# Patient Record
Sex: FEMALE | Race: WHITE | NOT HISPANIC OR LATINO | URBAN - METROPOLITAN AREA
[De-identification: names, ages, dates, MRNs, and addresses within clinical notes are randomized per-mention and may not be internally consistent; named-entity substitution may affect disease eponyms.]

---
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
## 2018-01-14 VITALS
HEART RATE: 62 BPM | DIASTOLIC BLOOD PRESSURE: 68 MMHG | SYSTOLIC BLOOD PRESSURE: 107 MMHG | HEIGHT: 63 IN | BODY MASS INDEX: 21.97 KG/M2 | WEIGHT: 124 LBS

## 2018-01-16 NOTE — CONSULTS
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  She is no alarm symptoms  She occasional has bloating dysphagia and heartburn which is improving  1 I told her to take align probiotic for a month  2I told her to take Pepcid twice a day for 2 weeks  3 if she calls back and she is not well she will get an endoscopy  History of Present Illness  HPI: She is a 49-year-old female who had some sort of viral syndrome and then had epigastric abdominal pain for a day she went to [facility] emergency room and was had normal blood work and urine testing  She then had pain afterwards with some occasional heartburn and dysphagia and bloating she's mild constipation no family history of Crohn's disease ulcerative colitis or celiac disease  Review of Systems  [facility]:   Constitutional: No complaints of fever or chills, feels well, no tiredness, no recent weight gain or loss  Eyes: No complaints of eye pain, no discharge, no eyesight problems, eyes do not itch, no red or dry eyes  ENT: no complaints of nasal discharge, no hoarseness, no earache, no nosebleeds, no loss of hearing, no sore throat  Cardiovascular: No complaints of chest pain, no palpitations, normal heart rate, no lower extremity edema  Respiratory: No complaints of cough, no shortness of breath, no wheezing, no leg claudication  Gastrointestinal: No complaints of abdominal pain, no nausea or vomiting, no constipation, no diarrhea or bloody stools  Genitourinary: No complaints of incontinence, no pelvic pain, no dysuria or dysmenorrhea, no abnormal vaginal bleeding or vaginal discharge     Musculoskeletal: No complaints of limb swelling or limb pain, no myalgias, no joint swelling or joint stiffness  Integumentary: No complaints of skin rash, no skin lesions or wounds, no itching, no breast pain, no breast lump  Neurological: No complaints of headache, no numbness or tingling, no confusion, no dizziness, no limb weakness, no convulsions or fainting, no difficulty walking  Psychiatric: No complaints of feeling depressed, no suicidal thoughts, no emotional problems, no anxiety, no sleep disturbances, no change in personality  Endocrine: No complaints of feeling weak, no muscle weakness, no deepening of voice, no hot flashes or proptosis  Hematologic/Lymphatic: No complaints of swollen glands, no neck swollen glands, does not bleed or bruise easily  ROS reported by the patient  ROS Reviewed:   ROS reviewed  Past Medical History  Active Problems And Past Medical History Reviewed: The active problems and past medical history were reviewed and updated today  Surgical History  Surgical History Reviewed: The surgical history was reviewed and updated today  Family History  Family History Reviewed: The family history was reviewed and updated today  Social History    · Never a smoker  Social History Reviewed: The social history was reviewed and updated today  The social history was reviewed and is unchanged  Current Meds   1  Loestrin 1/20 (21) 1-20 MG-MCG Oral Tablet; Take 1 tablet daily Recorded  Medication List Reviewed: The medication list was reviewed and updated today  Allergies    1  No Known Drug Allergies    Vitals  Vital Signs    Recorded: [id number] 03:34PM   Heart Rate 72   Systolic 871   Diastolic 80   Height 5 ft 2 in   Weight 123 lb 6 08 oz   BMI Calculated 22 57   BSA Calculated 1 56     Physical Exam    Constitutional - General appearance: No acute distress, well appearing and well nourished  Eyes - Conjunctiva and lids: No injection, edema or discharge  Pupils and irises: Equal, round, reactive to light bilaterally     Ears, Nose, Mouth, and Throat - External inspection of ears and nose: Normal without deformities or discharge  Otoscopic examination: Tympanic membranes gray, translucent with good bony landmarks and light reflex  Canals patent without erythema  Nasal mucosa, septum, and turbinates: Normal, no edema or discharge  Oropharynx: Moist mucosa, normal tongue and tonsils without lesions  Neck - Neck: Supple, symmetric, no masses  Pulmonary - Respiratory effort: Normal respiratory rate and rhythm, no increased work of breathing  Auscultation of lungs: Clear bilaterally  Cardiovascular - Auscultation of heart: Regular rate and rhythm, normal S1 and S2, no murmur  Pedal pulses: Normal, 2+ bilaterally  Examination of extremities for edema and/or varicosities: Normal    Abdomen - Abdomen: Normal bowel sounds, soft, non-tender, no masses  Liver and spleen: No hepatomegaly or splenomegaly  Lymphatic - Palpation of lymph nodes in neck: No anterior or posterior cervical lymphadenopathy  Musculoskeletal - Gait and station: Normal gait  Digits and nails: Normal without clubbing or cyanosis   Inspection/palpation of joints, bones, and muscles: Normal    Skin - Skin and subcutaneous tissue: Normal       Future Appointments    Date/Time Provider Specialty Site   03/31/2017 01:00 PM rCistina Terrazas MD Neurology [facility] [address]   Electronically signed by : Pati Velasquez MD; Mar  9 2017  4:02PM EST                       (Author)

## 2018-01-22 VITALS
BODY MASS INDEX: 22.7 KG/M2 | HEART RATE: 72 BPM | DIASTOLIC BLOOD PRESSURE: 80 MMHG | HEIGHT: 62 IN | SYSTOLIC BLOOD PRESSURE: 100 MMHG | WEIGHT: 123.38 LBS

## 2019-09-04 LAB
EXTERNAL HIV SCREEN: NORMAL
HCV AB SER-ACNC: NEGATIVE

## 2021-01-26 ENCOUNTER — APPOINTMENT (OUTPATIENT)
Dept: LAB | Facility: MEDICAL CENTER | Age: 21
End: 2021-01-26
Payer: COMMERCIAL

## 2021-01-26 ENCOUNTER — OFFICE VISIT (OUTPATIENT)
Dept: FAMILY MEDICINE CLINIC | Facility: CLINIC | Age: 21
End: 2021-01-26
Payer: COMMERCIAL

## 2021-01-26 VITALS
DIASTOLIC BLOOD PRESSURE: 68 MMHG | WEIGHT: 111 LBS | HEART RATE: 63 BPM | BODY MASS INDEX: 19.67 KG/M2 | SYSTOLIC BLOOD PRESSURE: 106 MMHG | TEMPERATURE: 98.5 F | RESPIRATION RATE: 16 BRPM | HEIGHT: 63 IN | OXYGEN SATURATION: 98 %

## 2021-01-26 DIAGNOSIS — R59.0 POSTERIOR CERVICAL ADENOPATHY: ICD-10-CM

## 2021-01-26 DIAGNOSIS — T78.40XA ALLERGY, INITIAL ENCOUNTER: ICD-10-CM

## 2021-01-26 DIAGNOSIS — L21.9 SEBORRHEA: Primary | ICD-10-CM

## 2021-01-26 DIAGNOSIS — R53.83 FATIGUE, UNSPECIFIED TYPE: ICD-10-CM

## 2021-01-26 DIAGNOSIS — E78.00 HYPERCHOLESTEROLEMIA: ICD-10-CM

## 2021-01-26 LAB
ALBUMIN SERPL BCP-MCNC: 4.5 G/DL (ref 3.5–5)
ALP SERPL-CCNC: 89 U/L (ref 46–116)
ALT SERPL W P-5'-P-CCNC: 29 U/L (ref 12–78)
ANION GAP SERPL CALCULATED.3IONS-SCNC: 2 MMOL/L (ref 4–13)
AST SERPL W P-5'-P-CCNC: 20 U/L (ref 5–45)
BILIRUB SERPL-MCNC: 0.84 MG/DL (ref 0.2–1)
BUN SERPL-MCNC: 9 MG/DL (ref 5–25)
CALCIUM SERPL-MCNC: 9.7 MG/DL (ref 8.3–10.1)
CHLORIDE SERPL-SCNC: 107 MMOL/L (ref 100–108)
CHOLEST SERPL-MCNC: 180 MG/DL (ref 50–200)
CO2 SERPL-SCNC: 30 MMOL/L (ref 21–32)
CREAT SERPL-MCNC: 0.84 MG/DL (ref 0.6–1.3)
ERYTHROCYTE [DISTWIDTH] IN BLOOD BY AUTOMATED COUNT: 12.4 % (ref 11.6–15.1)
GFR SERPL CREATININE-BSD FRML MDRD: 99 ML/MIN/1.73SQ M
GLUCOSE P FAST SERPL-MCNC: 82 MG/DL (ref 65–99)
HCT VFR BLD AUTO: 40.1 % (ref 34.8–46.1)
HDLC SERPL-MCNC: 49 MG/DL
HGB BLD-MCNC: 12.9 G/DL (ref 11.5–15.4)
LDLC SERPL CALC-MCNC: 117 MG/DL (ref 0–100)
MCH RBC QN AUTO: 29.1 PG (ref 26.8–34.3)
MCHC RBC AUTO-ENTMCNC: 32.2 G/DL (ref 31.4–37.4)
MCV RBC AUTO: 91 FL (ref 82–98)
NONHDLC SERPL-MCNC: 131 MG/DL
PLATELET # BLD AUTO: 308 THOUSANDS/UL (ref 149–390)
PMV BLD AUTO: 10.2 FL (ref 8.9–12.7)
POTASSIUM SERPL-SCNC: 4.1 MMOL/L (ref 3.5–5.3)
PROT SERPL-MCNC: 7.6 G/DL (ref 6.4–8.2)
RBC # BLD AUTO: 4.43 MILLION/UL (ref 3.81–5.12)
SODIUM SERPL-SCNC: 139 MMOL/L (ref 136–145)
TRIGL SERPL-MCNC: 68 MG/DL
TSH SERPL DL<=0.05 MIU/L-ACNC: 1.98 UIU/ML (ref 0.36–3.74)
WBC # BLD AUTO: 5.69 THOUSAND/UL (ref 4.31–10.16)

## 2021-01-26 PROCEDURE — 85027 COMPLETE CBC AUTOMATED: CPT

## 2021-01-26 PROCEDURE — 80061 LIPID PANEL: CPT

## 2021-01-26 PROCEDURE — 84443 ASSAY THYROID STIM HORMONE: CPT

## 2021-01-26 PROCEDURE — 99203 OFFICE O/P NEW LOW 30 MIN: CPT | Performed by: INTERNAL MEDICINE

## 2021-01-26 PROCEDURE — 36415 COLL VENOUS BLD VENIPUNCTURE: CPT

## 2021-01-26 PROCEDURE — 80053 COMPREHEN METABOLIC PANEL: CPT

## 2021-01-26 RX ORDER — KETOCONAZOLE 20 MG/ML
SHAMPOO TOPICAL
Qty: 120 ML | Refills: 1 | Status: SHIPPED | OUTPATIENT
Start: 2021-01-26

## 2021-01-26 RX ORDER — CEFPROZIL 500 MG/1
500 TABLET, FILM COATED ORAL 2 TIMES DAILY
Qty: 20 TABLET | Refills: 0 | Status: SHIPPED | OUTPATIENT
Start: 2021-01-26 | End: 2021-10-01

## 2021-01-26 RX ORDER — MOMETASONE FUROATE 1 MG/G
CREAM TOPICAL DAILY
COMMUNITY

## 2021-01-26 NOTE — PROGRESS NOTES
Assessment/Plan:         Diagnoses and all orders for this visit:    Seborrhea  Comments:  nizoral/? superinfect  Orders:  -     cefprosil (CEFZIL) 500 MG tablet; Take 1 tablet (500 mg total) by mouth 2 (two) times a day for 10 days  -     ketoconazole (NIZORAL) 2 % shampoo; Wash hair 2 x / weekly for a month allow it to set on scalp x 4 min  After 1 month use only weekly  -     Ambulatory referral to Allergy; Future    Posterior cervical adenopathy  Comments:  one node rt upper post cerv node  Orders:  -     CBC; Future  -     Ambulatory referral to Allergy; Future    Hypercholesterolemia  Comments:  due for lab  Orders:  -     Comprehensive metabolic panel; Future  -     Lipid panel; Future    Fatigue, unspecified type  Comments:  due for lab  Orders:  -     TSH, 3rd generation with Free T4 reflex; Future    Allergy, initial encounter  Comments:  request appt ? allergic to eye makeup    Other orders  -     Mirena, 52 MG, 20 MCG/24HR IUD  -     mometasone (ELOCON) 0 1 % cream; Apply topically daily          Subjective:      Patient ID: Soniya Shore is a 23 y o  female  Pt complains of ? pimpiles on scalp and had a line to ? Node on rt occipital lymph node  +tender to touch  The node is getting smaller  The following portions of the patient's history were reviewed and updated as appropriate: She  has a past medical history of Eczema and Migraines  She   Patient Active Problem List    Diagnosis Date Noted    Hypercholesterolemia 01/26/2021     She  has no past surgical history on file  Her family history includes No Known Problems in her father; Seizures in her mother  She  reports that she has never smoked  She has never used smokeless tobacco  She reports current alcohol use  She reports that she does not use drugs    Current Outpatient Medications   Medication Sig Dispense Refill    Mirena, 52 MG, 20 MCG/24HR IUD       mometasone (ELOCON) 0 1 % cream Apply topically daily      cefprosil (CEFZIL) 500 MG tablet Take 1 tablet (500 mg total) by mouth 2 (two) times a day for 10 days 20 tablet 0    ketoconazole (NIZORAL) 2 % shampoo Wash hair 2 x / weekly for a month allow it to set on scalp x 4 min  After 1 month use only weekly 120 mL 1     No current facility-administered medications for this visit  Current Outpatient Medications on File Prior to Visit   Medication Sig    Mirena, 52 MG, 20 MCG/24HR IUD     mometasone (ELOCON) 0 1 % cream Apply topically daily     No current facility-administered medications on file prior to visit  She has No Known Allergies       Review of Systems   Constitutional: Negative  HENT: Negative  Respiratory: Negative  Cardiovascular: Negative  Skin:        +acnea  Gets puffy at eyes with using mascara or eyeliner  If she uses vegan mascara it is better  Objective:      /68 (BP Location: Left arm, Patient Position: Sitting, Cuff Size: Standard)   Pulse 63   Temp 98 5 °F (36 9 °C) (Temporal)   Resp 16   Ht 5' 2 5" (1 588 m)   Wt 50 3 kg (111 lb)   SpO2 98%   BMI 19 98 kg/m²          Physical Exam  Constitutional:       Appearance: Normal appearance  HENT:      Head: Normocephalic and atraumatic  Right Ear: Tympanic membrane, ear canal and external ear normal       Left Ear: Tympanic membrane, ear canal and external ear normal       Nose: Nose normal       Mouth/Throat:      Pharynx: No posterior oropharyngeal erythema  Neck:      Musculoskeletal: Neck supple  Cardiovascular:      Rate and Rhythm: Normal rate and regular rhythm  Pulmonary:      Effort: Pulmonary effort is normal       Breath sounds: Normal breath sounds  Neurological:      Mental Status: She is alert

## 2021-01-27 ENCOUNTER — TELEPHONE (OUTPATIENT)
Dept: ADMINISTRATIVE | Facility: OTHER | Age: 21
End: 2021-01-27

## 2021-01-27 NOTE — TELEPHONE ENCOUNTER
Upon review of the In Basket request we were able to locate, review, and update the patient chart as requested for Hepatitis C , HIV and Pap Smear (HPV) aka Cervical Cancer Screening  Any additional questions or concerns should be emailed to the Practice Liaisons via [email or web address] email, please do not reply via In Basket      Thank you  Kiana Obregon

## 2021-01-27 NOTE — TELEPHONE ENCOUNTER
----- Message from Cornell Conde sent at 1/26/2021 12:59 PM EST -----  Regarding: sebastian khan  Contact: [phone]  01/26/21 12:59 PM    Hello, our patient Filomena Haq has had Pap Smear (HPV) aka Cervical Cancer Screening completed/performed  Please assist in updating the patient chart by pulling the Care Everywhere (CE) document  The date of service is 10/15/2020       Thank you,  JAMILA Conde PG

## 2021-02-15 ENCOUNTER — OFFICE VISIT (OUTPATIENT)
Dept: FAMILY MEDICINE CLINIC | Facility: CLINIC | Age: 21
End: 2021-02-15
Payer: COMMERCIAL

## 2021-02-15 VITALS
DIASTOLIC BLOOD PRESSURE: 62 MMHG | RESPIRATION RATE: 16 BRPM | HEART RATE: 65 BPM | TEMPERATURE: 97.8 F | BODY MASS INDEX: 20.02 KG/M2 | SYSTOLIC BLOOD PRESSURE: 98 MMHG | HEIGHT: 63 IN | OXYGEN SATURATION: 95 % | WEIGHT: 113 LBS

## 2021-02-15 DIAGNOSIS — R29.898 WEAKNESS OF RIGHT LOWER EXTREMITY: Primary | ICD-10-CM

## 2021-02-15 DIAGNOSIS — L21.9 SEBORRHEA: ICD-10-CM

## 2021-02-15 PROCEDURE — 99213 OFFICE O/P EST LOW 20 MIN: CPT | Performed by: INTERNAL MEDICINE

## 2021-02-15 RX ORDER — MELOXICAM 15 MG/1
15 TABLET ORAL DAILY
Qty: 10 TABLET | Refills: 1 | Status: SHIPPED | OUTPATIENT
Start: 2021-02-15

## 2021-02-15 NOTE — PROGRESS NOTES
Assessment/Plan:         Diagnoses and all orders for this visit:    Weakness of right lower extremity  Comments:  will try nsaid and pt eval  Orders:  -     meloxicam (MOBIC) 15 mg tablet; Take 1 tablet (15 mg total) by mouth daily  -     Ambulatory referral to Physical Therapy; Future    Seborrhea          Subjective:      Patient ID: Sumit Alba is a 23 y o  female  Her post upper cerv lymph node practically resolved and less skin scalp lesions with nizoral   Her rt hip feels popping and she feels asymetric ? Weak in le  The following portions of the patient's history were reviewed and updated as appropriate: She  has a past medical history of Eczema and Migraines  She   Patient Active Problem List    Diagnosis Date Noted    Hypercholesterolemia 01/26/2021     She  has no past surgical history on file  Her family history includes No Known Problems in her father; Seizures in her mother  She  reports that she has never smoked  She has never used smokeless tobacco  She reports current alcohol use  She reports that she does not use drugs  Current Outpatient Medications   Medication Sig Dispense Refill    ketoconazole (NIZORAL) 2 % shampoo Wash hair 2 x / weekly for a month allow it to set on scalp x 4 min  After 1 month use only weekly 120 mL 1    Mirena, 52 MG, 20 MCG/24HR IUD       mometasone (ELOCON) 0 1 % cream Apply topically daily      meloxicam (MOBIC) 15 mg tablet Take 1 tablet (15 mg total) by mouth daily 10 tablet 1     No current facility-administered medications for this visit  Current Outpatient Medications on File Prior to Visit   Medication Sig    ketoconazole (NIZORAL) 2 % shampoo Wash hair 2 x / weekly for a month allow it to set on scalp x 4 min  After 1 month use only weekly    Mirena, 52 MG, 20 MCG/24HR IUD     mometasone (ELOCON) 0 1 % cream Apply topically daily     No current facility-administered medications on file prior to visit        She has No Known Allergies       Review of Systems   Constitutional: Negative  HENT: Negative  Respiratory: Negative  Cardiovascular: Negative  Objective:      BP 98/62 (BP Location: Left arm, Patient Position: Sitting, Cuff Size: Standard)   Pulse 65   Temp 97 8 °F (36 6 °C) (Temporal)   Resp 16   Ht 5' 2 5" (1 588 m)   Wt 51 3 kg (113 lb)   SpO2 95%   BMI 20 34 kg/m²          Physical Exam  Constitutional:       Appearance: Normal appearance  HENT:      Head: Normocephalic and atraumatic  Neck:      Musculoskeletal: Neck supple  No muscular tenderness  Cardiovascular:      Rate and Rhythm: Normal rate and regular rhythm  Pulmonary:      Effort: Pulmonary effort is normal       Breath sounds: Normal breath sounds  Lymphadenopathy:      Cervical: No cervical adenopathy  Neurological:      Mental Status: She is alert

## 2021-03-30 ENCOUNTER — EVALUATION (OUTPATIENT)
Dept: PHYSICAL THERAPY | Facility: CLINIC | Age: 21
End: 2021-03-30
Payer: COMMERCIAL

## 2021-03-30 DIAGNOSIS — R29.898 RIGHT LEG WEAKNESS: Primary | ICD-10-CM

## 2021-03-30 PROCEDURE — 97110 THERAPEUTIC EXERCISES: CPT | Performed by: PHYSICAL THERAPIST

## 2021-03-30 PROCEDURE — 97161 PT EVAL LOW COMPLEX 20 MIN: CPT | Performed by: PHYSICAL THERAPIST

## 2021-03-30 NOTE — PROGRESS NOTES
PT Evaluation     Today's date: 3/30/2021  Patient name: Vanessa Montejo  : 2000  MRN: [id number]  Referring provider: Anali Angelo DO  Dx:   Encounter Diagnosis     ICD-10-CM    1  Right leg weakness  R29 898        Start Time: 1545  Stop Time: 1630  Total time in clinic (min): 45 minutes    Assessment  Assessment details: Pt is a 24 y/o female presenting to physical therapy with chief complaint of RLE weakness, particularly in the R hip  In standing, she presents with B pes planus (R>L), higher iliac crest and PSIS on the L  In supine, the L ASIS and iliac crest are higher, but leg length is equally bilaterally  This was normalized following L hip LAD  Pt has weakness in the R hip abductor, which is likely contributing to her altered squatting mechanics  Her R pes planus is also likely playing a role in squat mechanics as well  Pt would benefit from physical therapy in order to improve hip abduction weakness and mechanics in order to improve symptoms  Impairments: abnormal or restricted ROM, activity intolerance, impaired physical strength, lacks appropriate home exercise program and pain with function  Functional limitations: squatting  Symptom irritability: lowUnderstanding of Dx/Px/POC: good   Prognosis: good    Goals  ST weeks  1  Pt will demonstrate independence with HEP  2  Pt will improve R hip abduction by at leas 1/2 grade  3  Pt will demonstrate equal WS during squatting    LT weeks  1  Pt will report a reduction in symptoms  2  Pt will improve R hip abduction strength to 4+/5  3   Pt will be able to exercise without symptoms    Plan  Plan details: Pt will call to schedule once she gets her work schedule  Patient would benefit from: skilled physical therapy  Planned modality interventions: cryotherapy and thermotherapy: hydrocollator packs  Planned therapy interventions: therapeutic exercise, therapeutic activities, stretching, strengthening, patient education, neuromuscular re-education, massage, manual therapy, balance, gait training and home exercise program  Frequency: 1x week  Duration in weeks: 4  Treatment plan discussed with: patient        Subjective Evaluation    History of Present Illness  Mechanism of injury: Pt reports for about 2 years now, one side of her body "feels completely different than the other "  She reports she notices her hip popping and this is the main area where she feels the imbalance  She reports differences in how she straightens her knee and points her toes  She reports she "can never sit and feel even " She reports minimal discomfort, but not pain  She reports it is difficulty for her to balance on the RLE             Recurrent probem    Quality of life: good    Pain  Current pain ratin  At best pain ratin  At worst pain ratin  Quality: discomfort  Aggravating factors: lifting  Progression: no change    Treatments  Previous treatment: chiropractic  Patient Goals  Patient goals for therapy: increased strength and return to sport/leisure activities          Objective     Postural Observations    Additional Postural Observation Details  Standing: L iliac crest higher, L PSIS higher  Supine: L iliac crest higher, L ASIS higher    Improved alignment following L hip LAD    Neurological Testing     Reflexes   Left   Patellar (L4): normal (2+)  Achilles (S1): normal (2+)    Right   Patellar (L4): normal (2+)  Achilles (S1): normal (2+)    Active Range of Motion     Lumbar   Flexion:  WFL  Extension:  WFL  Left lateral flexion:  WFL  Right lateral flexion:  WFL    Strength/Myotome Testing     Left Hip   Planes of Motion   Flexion: 4+  Abduction: 4+  External rotation: 4+  Internal rotation: 4+    Right Hip   Planes of Motion   Flexion: 4+  Abduction: 4-  External rotation: 4+  Internal rotation: 4+    Left Knee   Flexion: 4+  Extension: 4+    Right Knee   Flexion: 4+  Extension: 4+    Left Ankle/Foot   Dorsiflexion: 4+    Right Ankle/Foot Dorsiflexion: 4+    Tests     Lumbar   Negative SIJ compression and sacroiliac distraction  Right Hip   Negative ALISON and FADIR       General Comments:      Lumbar Comments  Squatting: WS noted to the R    Ankle/Foot Comments   B pes planus, R>L             Precautions: **1x/week, UPDATE HEP EACH VISIT**      Manuals 3/30                                                                Neuro Re-Ed             STS c TB HEP            Static stork             Arch raises                                                                 Ther Ex             Clamshells HEP            S/L hip abd HEP            SKFO HEP                                                                             Ther Activity                                       Gait Training                                       Modalities

## 2021-04-06 ENCOUNTER — OFFICE VISIT (OUTPATIENT)
Dept: PHYSICAL THERAPY | Facility: CLINIC | Age: 21
End: 2021-04-06
Payer: COMMERCIAL

## 2021-04-06 DIAGNOSIS — R29.898 RIGHT LEG WEAKNESS: Primary | ICD-10-CM

## 2021-04-06 PROCEDURE — 97112 NEUROMUSCULAR REEDUCATION: CPT

## 2021-04-06 PROCEDURE — 97110 THERAPEUTIC EXERCISES: CPT

## 2021-04-06 NOTE — PROGRESS NOTES
Daily Note     Today's date: 2021  Patient name: Toshia Stacy  : 2000  MRN: [id number]  Referring provider: Meir Garcia DO  Dx:   Encounter Diagnosis     ICD-10-CM    1  Right leg weakness  R29 898                   Subjective: Pt states she is feeling stronger and is feeling less R knee pain now  Objective: See treatment diary below      Assessment: Patient performing hip ER in order to compensate for arch raises  Able to maintain this following verbal cuing  Requires FTA at times during SLS to maintain balance  Muscular fatigue is present post session  Pt would benefit from continued PT in order to improve hip, knee and ankle strength and stability for decreased pain and improved body mechanics during ADLs  Plan: Continue per plan of care        Precautions: **1x/week, UPDATE HEP EACH VISIT**      Manuals 3/30 4/6                                                               Neuro Re-Ed             STS c TB HEP GTB 2x20           Static stork  30"x2           Arch raises  x20           Hip 3 way on foam  RTB 2x10                                                   Ther Ex             Clamshells HEP 10"x10           S/L hip abd HEP 1# 2x10           SKFO HEP            bike  8'           SL bridge  2x10           Prone hip ext  2x10           Lateral walking TB at feet  RTB 4 laps           Hip hiking  4" 2x10           Ther Activity                                       Gait Training                                       Modalities

## 2021-05-07 NOTE — PROGRESS NOTES
PT Discharge    Today's date: 2021  Patient name: Gregoria Wilkins  : 2000  MRN: [id number]  Referring provider: Naresh Martins DO  Dx:   Encounter Diagnosis     ICD-10-CM    1  Right leg weakness  R29 898    Assessment  Assessment details: Pt has not attended physical therapy since 21 and has no future appointments scheduled  Subjective and objective information and goals unable to be updated at this time  Pt DC from skilled therapy  Impairments: abnormal or restricted ROM, activity intolerance, impaired physical strength, lacks appropriate home exercise program and pain with function  Functional limitations: squatting  Symptom irritability: lowUnderstanding of Dx/Px/POC: good   Prognosis: good    Goals  ST weeks - unable to assess  1  Pt will demonstrate independence with HEP  2  Pt will improve R hip abduction by at leas 1/2 grade  3  Pt will demonstrate equal WS during squatting    LT weeks - unable to assess  1  Pt will report a reduction in symptoms  2  Pt will improve R hip abduction strength to 4+/5  3  Pt will be able to exercise without symptoms    Plan  Patient would benefit from: skilled physical therapy  Planned modality interventions: cryotherapy and thermotherapy: hydrocollator packs  Planned therapy interventions: therapeutic exercise, therapeutic activities, stretching, strengthening, patient education, neuromuscular re-education, massage, manual therapy, balance, gait training and home exercise program  Treatment plan discussed with: patient        Subjective Evaluation    History of Present Illness  Mechanism of injury: Pt reports for about 2 years now, one side of her body "feels completely different than the other "  She reports she notices her hip popping and this is the main area where she feels the imbalance  She reports differences in how she straightens her knee and points her toes   She reports she "can never sit and feel even " She reports minimal discomfort, but not pain  She reports it is difficulty for her to balance on the RLE  Recurrent probem    Quality of life: good    Pain  Current pain ratin  At best pain ratin  At worst pain ratin  Quality: discomfort  Aggravating factors: lifting  Progression: no change    Treatments  Previous treatment: chiropractic  Patient Goals  Patient goals for therapy: increased strength and return to sport/leisure activities          Objective     Postural Observations    Additional Postural Observation Details  Standing: L iliac crest higher, L PSIS higher  Supine: L iliac crest higher, L ASIS higher    Improved alignment following L hip LAD    Neurological Testing     Reflexes   Left   Patellar (L4): normal (2+)  Achilles (S1): normal (2+)    Right   Patellar (L4): normal (2+)  Achilles (S1): normal (2+)    Active Range of Motion     Lumbar   Flexion:  WFL  Extension:  WFL  Left lateral flexion:  WFL  Right lateral flexion:  WFL    Strength/Myotome Testing     Left Hip   Planes of Motion   Flexion: 4+  Abduction: 4+  External rotation: 4+  Internal rotation: 4+    Right Hip   Planes of Motion   Flexion: 4+  Abduction: 4-  External rotation: 4+  Internal rotation: 4+    Left Knee   Flexion: 4+  Extension: 4+    Right Knee   Flexion: 4+  Extension: 4+    Left Ankle/Foot   Dorsiflexion: 4+    Right Ankle/Foot   Dorsiflexion: 4+    Tests     Lumbar   Negative SIJ compression and sacroiliac distraction  Right Hip   Negative ALISON and FADIR       General Comments:      Lumbar Comments  Squatting: WS noted to the R    Ankle/Foot Comments   B pes planus, R>L

## 2021-09-28 DIAGNOSIS — L21.9 SEBORRHEA: ICD-10-CM

## 2021-10-01 RX ORDER — CEFPROZIL 500 MG/1
500 TABLET, FILM COATED ORAL 2 TIMES DAILY
Qty: 20 TABLET | Refills: 0 | Status: SHIPPED | OUTPATIENT
Start: 2021-10-01 | End: 2021-10-11

## 2024-01-23 ENCOUNTER — APPOINTMENT (OUTPATIENT)
Dept: RADIOLOGY | Facility: CLINIC | Age: 24
End: 2024-01-23
Payer: COMMERCIAL

## 2024-01-23 DIAGNOSIS — S33.5XXA LUMBAR SPRAIN, INITIAL ENCOUNTER: ICD-10-CM

## 2024-01-23 DIAGNOSIS — M54.50 CHRONIC LOW BACK PAIN, UNSPECIFIED BACK PAIN LATERALITY, UNSPECIFIED WHETHER SCIATICA PRESENT: ICD-10-CM

## 2024-01-23 DIAGNOSIS — G89.29 CHRONIC LOW BACK PAIN, UNSPECIFIED BACK PAIN LATERALITY, UNSPECIFIED WHETHER SCIATICA PRESENT: ICD-10-CM

## 2024-01-23 PROCEDURE — 72114 X-RAY EXAM L-S SPINE BENDING: CPT

## 2024-06-28 ENCOUNTER — NURSE TRIAGE (OUTPATIENT)
Age: 24
End: 2024-06-28

## 2024-06-28 NOTE — TELEPHONE ENCOUNTER
"pt called asking about making an appt as   her dr thinks that she has EDS.  She is having   lots of different pains, peripheral neuropathy.    Different pains throughout her body, peripheral neuropathy down her left leg. Pulsing, stabbing pains to her left side and occasionally her right side. Stretchy skin, would dislocate things when she was young.    started after work injury in jan 2024-had bulging disc  also has irrgular heartrate, bradycardia and tachy at times, dizzy at times even prior to injury  She tried to schedule with  but they are not accepting new pt's at this time or not able to scheule until aug 2025    Spoke to management and they advised that dr huynh could see pt regarding her symptoms.    NP appt scheduled with Dr. Huynh for 7/31      Reason for Disposition   Weakness of arm or leg is a chronic symptom (recurrent or ongoing problem lasting > 4 weeks)    Answer Assessment - Initial Assessment Questions  1. SYMPTOM: \"What is the main symptom you are concerned about?\" (e.g., weakness, numbness)      Different pains throughout her body, peripheral neuropathy down her left leg, dr thinks that she may have EDS. Pulsing, stabbing pains to her left side.  Stretchy skin, would dislocate things when she was young.    2. ONSET: \"When did this start?\" (minutes, hours, days; while sleeping)      After work injury in jan 2024    4. PATTERN \"Does this come and go, or has it been constant since it started?\"  \"Is it present now?\"      Pain is daily,  stabbing pains can come and go  5. CARDIAC SYMPTOMS: \"Have you had any of the following symptoms: chest pain, difficulty breathing, palpitations?\"      Irregular heart rate, bradycardia and tachy at times  6. NEUROLOGIC SYMPTOMS: \"Have you had any of the following symptoms: headache, dizziness, vision loss, double vision, changes in speech, unsteady on your feet?\"      Dizziness, trips for no reason, bumps into things-had pre injury,   8. PREGNANCY: \"Is " "there any chance you are pregnant?\" \"When was your last menstrual period?\"      No-has IUD    Protocols used: Neurologic Deficit-ADULT-[place]    "

## 2024-07-23 ENCOUNTER — TELEPHONE (OUTPATIENT)
Age: 24
End: 2024-07-23

## 2024-07-31 ENCOUNTER — OFFICE VISIT (OUTPATIENT)
Age: 24
End: 2024-07-31
Payer: COMMERCIAL

## 2024-07-31 VITALS
DIASTOLIC BLOOD PRESSURE: 82 MMHG | BODY MASS INDEX: 20.24 KG/M2 | HEIGHT: 62 IN | SYSTOLIC BLOOD PRESSURE: 112 MMHG | WEIGHT: 110 LBS | OXYGEN SATURATION: 100 % | HEART RATE: 69 BPM

## 2024-07-31 DIAGNOSIS — G90.529: ICD-10-CM

## 2024-07-31 DIAGNOSIS — G62.9 POLYNEUROPATHY: ICD-10-CM

## 2024-07-31 DIAGNOSIS — R20.2 NUMBNESS AND TINGLING: ICD-10-CM

## 2024-07-31 DIAGNOSIS — R26.9 UNSPECIFIED ABNORMALITIES OF GAIT AND MOBILITY: ICD-10-CM

## 2024-07-31 DIAGNOSIS — R20.0 NUMBNESS AND TINGLING: ICD-10-CM

## 2024-07-31 DIAGNOSIS — R68.89 SPELLS OF DECREASED ATTENTIVENESS: ICD-10-CM

## 2024-07-31 DIAGNOSIS — R20.2 PARESTHESIA: Primary | ICD-10-CM

## 2024-07-31 PROCEDURE — 99205 OFFICE O/P NEW HI 60 MIN: CPT | Performed by: PSYCHIATRY & NEUROLOGY

## 2024-07-31 RX ORDER — NAPROXEN 500 MG/1
TABLET ORAL
COMMUNITY
Start: 2024-02-07

## 2024-07-31 RX ORDER — METHOCARBAMOL 500 MG/1
TABLET, FILM COATED ORAL
COMMUNITY

## 2024-07-31 RX ORDER — GABAPENTIN 300 MG/1
300 CAPSULE ORAL 2 TIMES DAILY
COMMUNITY

## 2024-07-31 RX ORDER — DEXTROAMPHETAMINE SACCHARATE, AMPHETAMINE ASPARTATE, DEXTROAMPHETAMINE SULFATE AND AMPHETAMINE SULFATE 5; 5; 5; 5 MG/1; MG/1; MG/1; MG/1
1 TABLET ORAL 2 TIMES DAILY
COMMUNITY
Start: 2024-07-05 | End: 2024-08-04

## 2024-08-05 ENCOUNTER — PATIENT MESSAGE (OUTPATIENT)
Age: 24
End: 2024-08-05

## 2024-08-10 ENCOUNTER — PATIENT MESSAGE (OUTPATIENT)
Age: 24
End: 2024-08-10

## 2024-08-12 NOTE — PATIENT COMMUNICATION
Dr. Huynh: please advise. Pt does not have a FU appt scheduled and is requesting a phone call to review results/discuss next steps. Are you able to call the pt or do you want pt to schedule an appt.                                                 Taylor Roy MD   to Neurology Multiple Sclerosis Team 3     8/12/24  9:58 AM  Dr. Huynh is off - no additional re-testing advised till his return.

## 2024-08-14 ENCOUNTER — TELEPHONE (OUTPATIENT)
Age: 24
End: 2024-08-14

## 2024-08-14 ENCOUNTER — HOSPITAL ENCOUNTER (OUTPATIENT)
Dept: MRI IMAGING | Facility: HOSPITAL | Age: 24
Discharge: HOME | End: 2024-08-14
Attending: PSYCHIATRY & NEUROLOGY
Payer: COMMERCIAL

## 2024-08-14 DIAGNOSIS — R26.9 UNSPECIFIED ABNORMALITIES OF GAIT AND MOBILITY: ICD-10-CM

## 2024-08-14 DIAGNOSIS — G62.9 POLYNEUROPATHY: ICD-10-CM

## 2024-08-14 PROCEDURE — 72156 MRI NECK SPINE W/O & W/DYE: CPT

## 2024-08-14 PROCEDURE — A9585 GADOBUTROL INJECTION: HCPCS | Performed by: PSYCHIATRY & NEUROLOGY

## 2024-08-14 PROCEDURE — 70553 MRI BRAIN STEM W/O & W/DYE: CPT

## 2024-08-14 RX ORDER — GADOBUTROL 604.72 MG/ML
4 INJECTION INTRAVENOUS
Status: COMPLETED | OUTPATIENT
Start: 2024-08-14 | End: 2024-08-14

## 2024-08-14 RX ADMIN — GADOBUTROL 4 ML: 604.72 INJECTION INTRAVENOUS at 21:26

## 2024-08-14 NOTE — TELEPHONE ENCOUNTER
Advised her of results and recommendations below. Pt verbalized understanding.     Vitamin B1 36H (8 - 30)  Vitamin B6 22.6H (2.1 - 21.7)    Pt requested lab results be sent to her at email address on file.    Called [facility] [phone]. Spoke w/Prudence. PCP is now w/[facility].     [phone] (f)     Lab results faxed to PCP.     Clerical - please send 8/6/24 External Order Panel (lab results) to pt at email address on file. Thank you!

## 2024-08-14 NOTE — TELEPHONE ENCOUNTER
----- Message from Sarah ALVAREZ sent at 8/13/2024  2:27 PM EDT -----    ----- Message -----  From: Robinson Baez MD  Sent: 8/12/2024  12:19 PM EDT  To: Neurology Epilepsy Team 1    Please have the patient follow-up with PCP regarding abnormal vitamin and increased levels

## 2024-08-15 NOTE — TELEPHONE ENCOUNTER
Susana from pts PCP office called requesting a copy of this pts labs ordered by Dr Huynh.      fax# [phone]    ph# [phone]

## 2024-08-19 ENCOUNTER — TELEPHONE (OUTPATIENT)
Dept: OTHER | Facility: OTHER | Age: 24
End: 2024-08-19

## 2024-08-19 NOTE — TELEPHONE ENCOUNTER
Patient states she was referred as new patient to establish care with Dr. Santos. Please call back to schedule. She asks that please do not call between [phone] tomorrow as she won't be able to answer.

## 2024-09-04 ENCOUNTER — HOSPITAL ENCOUNTER (OUTPATIENT)
Dept: NEUROLOGY | Facility: CLINIC | Age: 24
Discharge: HOME | End: 2024-09-04
Payer: COMMERCIAL

## 2024-09-04 DIAGNOSIS — G62.9 POLYNEUROPATHY: ICD-10-CM

## 2024-09-04 DIAGNOSIS — R26.9 UNSPECIFIED ABNORMALITIES OF GAIT AND MOBILITY: ICD-10-CM

## 2024-09-04 PROCEDURE — 95816 EEG AWAKE AND DROWSY: CPT | Performed by: PSYCHIATRY & NEUROLOGY

## 2024-09-04 PROCEDURE — 95816 EEG AWAKE AND DROWSY: CPT

## 2024-09-10 NOTE — RESULT ENCOUNTER NOTE
EEG test results were reviewed and are reported within normal limits.  Updated the patient through [employer]

## 2024-09-17 ENCOUNTER — NURSE TRIAGE (OUTPATIENT)
Age: 24
End: 2024-09-17

## 2024-09-17 DIAGNOSIS — Q79.60 EHLERS-DANLOS SYNDROME: Primary | ICD-10-CM

## 2024-09-17 NOTE — TELEPHONE ENCOUNTER
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     Snow said she heard Dr. Morfin was knowledgeable on Autumn-Danlos syndrome and originally she was going to make her appointment with Dr. Morfin but she was booked out without appointments available.     Patient is unsure if Dr. Huynh already asked her as she did mention it in her last office appointment. Snow said she also asked for a rheumatology consult. Snow said her provider referred her to a rheumatologist but she is \"striking out\" because and they said none of the rheumatologists at [facility] would be able to help her with he syndrome and do not treat arthritis.     Snow is trying to get it figured out because she knows it can take a while to make appointments. Patient has to wait until next year for genetic testing. Snow wants to have someone else assess her to determine if she has early arthritis versus something else.     Snow call back number is [phone], it is ok to leave a detailed voicemail.     Dr. Morfin/ Dr. Huynh please advise. Thank you!      Reason for Disposition   Information only question and nurse able to answer    Answer Assessment - Initial Assessment Questions  1. 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    Protocols used: Information Only Call - No Triage-ADULT-[place]    "

## 2024-09-18 ENCOUNTER — TELEPHONE (OUTPATIENT)
Age: 24
End: 2024-09-18

## 2024-09-18 NOTE — TELEPHONE ENCOUNTER
Zeinab Morfin MD    9/18/24  1:40 AM  Please let the patient know unfortunately I may not be able to help her with that.  Rheumatology would be the right next step for this evaluation.  If she is not able to see anyone at [facility], she can see our colleagues at [facility].  ---------------------------------------------    Outbound call made to Patient unsuccessful. A detailed voicemail was left with Dr. Morfin's advisement.

## 2024-09-18 NOTE — TELEPHONE ENCOUNTER
Patient was transfer to me from Neurology. She is looking to schedule an appt with us for Autumn Danlos Syndrome. I advised patient that once we receive a referral we can get her an appt. Patient said she will call her doctor for a referral now.

## 2024-09-18 NOTE — TELEPHONE ENCOUNTER
Patient called back in to request contact information for Rheumatology; provided [facility] PH # and warm transferred as well.     Thank you

## 2024-09-18 NOTE — TELEPHONE ENCOUNTER
Spoke w/pt. Advised her referral to Rheumatology has been placed. Pt verbalized understanding and appreciation.

## 2024-09-18 NOTE — TELEPHONE ENCOUNTER
Pt called re: referral to Rheumatology. [facility] is able to treat Autumn Danlos Syndrome but they need a referral.     LOV - 7/31/24    Dr. Huynh - please place referral to Rheumatology if agreeable. Thank you!

## 2024-09-18 NOTE — TELEPHONE ENCOUNTER
1. Autumn-Danlos syndrome    - Ambulatory referral to Rheumatology; Future        Mendiolakaity Huynh MD.   Staff Neurologist  09/18/24   12:40 PM

## 2024-09-19 ENCOUNTER — APPOINTMENT (OUTPATIENT)
Dept: LAB | Facility: CLINIC | Age: 24
End: 2024-09-19
Payer: COMMERCIAL

## 2024-09-19 ENCOUNTER — OFFICE VISIT (OUTPATIENT)
Dept: RHEUMATOLOGY | Facility: CLINIC | Age: 24
End: 2024-09-19

## 2024-09-19 VITALS
HEIGHT: 62 IN | DIASTOLIC BLOOD PRESSURE: 62 MMHG | OXYGEN SATURATION: 100 % | SYSTOLIC BLOOD PRESSURE: 134 MMHG | BODY MASS INDEX: 19.88 KG/M2 | WEIGHT: 108 LBS | HEART RATE: 65 BPM

## 2024-09-19 DIAGNOSIS — Q79.60 EHLERS-DANLOS SYNDROME: ICD-10-CM

## 2024-09-19 PROCEDURE — 36415 COLL VENOUS BLD VENIPUNCTURE: CPT

## 2024-09-19 PROCEDURE — 86430 RHEUMATOID FACTOR TEST QUAL: CPT

## 2024-09-19 PROCEDURE — 86200 CCP ANTIBODY: CPT

## 2024-09-19 PROCEDURE — 86803 HEPATITIS C AB TEST: CPT

## 2024-09-19 NOTE — TELEPHONE ENCOUNTER
Pt called. She wanted to make Dr. Huynh aware that she did go to see Dr. Verma (rheumatology) today. Pt reports that Dr. Verma told her that he has no training in Autumn-Danlos syndrome. Pt also said that nobody in [facility] does. She wanted to make this office aware of that. She is going to be tested for rheumatoid arthritis. If she has rheumatoid arthritis, she will continue to follow with Dr. Verma. If not, she does not need to return to the office. Pt did call around to other offices and has an OV scheduled in November for a rheumatologist in [place], [place] that does evaluate and diagnose for EDS. She is unable to get into genetic testing until August of 2025. Dr. Verma did give pt other phone numbers for genetic testing. She is going to explore this option to try to be seen sooner. Routed to provider to make him aware.

## 2024-09-19 NOTE — PROGRESS NOTES
"  This is a Rheumatology Consult seen at the request of       HPI: Samaria Goldman is a 25 y/o female who presents for further evaluation possible rheumatoid arthritis. She has past medical history eczema, migraine headaches, neuropathy, ADHD    She has had chronic hypermobility since childhood. 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     Also follows with Orthopedics at [facility] and had knee injection    --------------------------------------------------------------------------------------------------------        ROS:        All other ROS was reviewed and negative except as above         --------------------------------------------------------------------------------------------------------    Past Medical History    Past Medical History:   Diagnosis Date    Difficulty walking     Eczema     Migraines     Peripheral neuropathy 1/17/24           Past Surgical History    No surgeries         Family History    Family History   Problem Relation Age of Onset    Migraines Mother     Seizures Mother     No Known Problems Father             Social History    Social History     Tobacco Use    Smoking status: Never    Smokeless tobacco: Never    Tobacco comments:     Second hand smoke    Vaping Use    Vaping status: Never Used   Substance Use Topics    Alcohol use: Yes     Comment: Very rarely    Drug use: Yes     Types: " "Marijuana     Comment: Medical     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    mometasone (ELOCON) 0.1 % cream Topical, Daily    naproxen (NAPROSYN) 500 mg tablet Take every 12 hrs with food.          Physical Exam    /62   Pulse 65   Ht 5' 2\" (1.575 m)   Wt 49 kg (108 lb)   SpO2 100%   BMI 19.75 kg/m²     GEN: AAO, No apparent distress.  Patient is well developed.  HEENT:  Pupils are equal, round and reactive.  Sclera are clear.  Fundoscopic exam is normal.  External ears are without lesions.  Oral pharynx is clear of ulcers or other lesions.  MMM.   NECK:  Supple.  There is no adenopathy appreciable in anterior or posterior cervical chains or supraclavicularly.  JVP is normal.    HEART: Regular rate and rhythm.  There is no appreciable murmur, gallop or rub.  LUNGS: Clear to auscultation.  ABD:  Soft, without tenderness, rebound or guarding.  No appreciable organomegally.  NEURO: Speech and cognition are normal.  Strength is 5/5 throughout.  Tone is normal.  DTRs are 2/4 at the knees, ankles and elbows.  Gait is normal.  SKIN: There are no rashes or lesions    MUSCULOSKELETAL:   No synovitis noted      ________________________________________________________________________          Results Review    Labs pending       Impressions and Plans:    Samaria Goldman is a 23 y/o female with chronic hypermobility    She is scheduled to see EDS specialist on [place]    I have reviewed with her rheumatology here does not follow EDS. Recommend f/u with PCP    Recommend OTC NSAIDs/PT/OT per primary    She is also having issues with work injury for " which she follows with neurology and pain management     Check serologies    Will review test results and f/u as needed        Thank you for involving me in this patient's care.        Demetrius Verma MD  [facility]

## 2024-09-20 LAB
HCV AB SER QL: NORMAL
RHEUMATOID FACT SER QL LA: NEGATIVE

## 2024-09-22 LAB — CCP AB SER IA-ACNC: 0.9

## 2024-10-01 ENCOUNTER — TELEPHONE (OUTPATIENT)
Dept: PAIN MEDICINE | Facility: CLINIC | Age: 24
End: 2024-10-01

## 2024-10-08 ENCOUNTER — TELEPHONE (OUTPATIENT)
Dept: OBGYN CLINIC | Facility: CLINIC | Age: 24
End: 2024-10-08

## 2024-10-08 NOTE — TELEPHONE ENCOUNTER
Spoke to patient,patient will call back either later today or tomorrow with new insurance information

## 2024-10-09 NOTE — PROGRESS NOTES
Assessment:  1. Chronic bilateral low back pain with left-sided sciatica    2. Chronic pain syndrome    3. Total body pain    4. Pars defect        Plan:  Orders Placed This Encounter   Procedures    Ambulatory referral to Physical Therapy     Standing Status:   Future     Standing Expiration Date:   10/11/2025     Referral Priority:   Routine     Referral Type:   Physical Therapy     Referral Reason:   Specialty Services Required     Requested Specialty:   Physical Therapy     Number of Visits Requested:   1     Expiration Date:   10/11/2025       No orders of the defined types were placed in this encounter.      My impressions and treatment recommendations were discussed in detail with the patient, who verbalized understanding and had no further questions.    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  Imaging of the cervical and lumbar spines fairly unremarkable.  EMG lower extremities negative.  Status post L5-S1 LESI x 2 with minimal relief. Notable pars defect at L5 without significant spondylolisthesis    Differential diagnosis for her pain include centralized pain disorder/fibromyalgia and back pain secondary to pars defect. We recommend trial of aquatic therapy with return in 6 weeks . Will hold off on further injections or medication at this time.     [place] Prescription Drug Monitoring Program report was reviewed and was appropriate     Complete risks and benefits including bleeding, infection, tissue reaction, nerve injury and allergic reaction were discussed. The approach was demonstrated using models and literature was provided. Verbal and written consent was obtained.    Discharge instructions were provided. I personally saw and examined the patient and I agree with the above discussed plan of care.    History of Present Illness:    Samaria Goldman is a 24 y.o.  "female who presents to [facility] for initial evaluation of the above stated pain complaints. The patient has a past medical and chronic pain history as outlined in the assessment section. She was referred by Maury Huynh MD  [address] [facility]  [address] [place] [id number] .    Patient with chief complaint of bilateral lower back pain, left leg pain, muscle spasms, cramps \"all over\".  This has been ongoing since January 2020 for which she fell at work.  She works as a .  Pain is moderate to severe over the past 1.  4-5 out of 10.  Constant.  Burning, cramping, shooting, sharp, throbbing, dull/aching.    There is sometimes subjective weakness of the lower extremities.    The pain varies throughout the day.     escalation notable for Autumn-Danlos syndrome.    She saw pain management in another facility.  Had two L5-S1 LESI.  Last injection in August 2024.  Moderate relief.    She reports little to moderate relief with exercise treatment.  Moderately fatigued/ice therapy.    She uses marijuana nightly.  Alcohol rarely.  Not allergic to latex or contrast dye.    Currently using naproxen which provides some relief.  Currently using Robaxin with some relief.  Currently using gabapentin with relief.    MRI report reviewed.  No significant compressive pathology mentioned.  EMG of lower extremities normal.  X-ray of the legs notable for L5 pars defect    She does report back flares with tingling in the left leg.     Review of Systems:    Review of Systems   Constitutional:  Positive for unexpected weight change.   Musculoskeletal:  Positive for arthralgias, back pain and myalgias.   Skin:  Positive for rash and wound.   Neurological:  Positive for dizziness and headaches.   Psychiatric/Behavioral:  Positive for decreased concentration. The patient is nervous/anxious.            Past Medical History:   Diagnosis Date    Difficulty walking     Eczema     " "Migraines     Peripheral neuropathy 1/17/24       History reviewed. No pertinent surgical history.    Family History   Problem Relation Age of Onset    Migraines Mother     Seizures Mother     No Known Problems Father        Social History     Occupational History    Not on file   Tobacco Use    Smoking status: Never    Smokeless tobacco: Never    Tobacco comments:     Second hand smoke    Vaping Use    Vaping status: Never Used   Substance and Sexual Activity    Alcohol use: Yes     Comment: Very rarely    Drug use: Yes     Types: Marijuana     Comment: Medical    Sexual activity: Yes     Partners: Male     Birth control/protection: I.U.D.     Comment: Boyfriend         Current Outpatient Medications:     gabapentin (NEURONTIN) 300 mg capsule, Take 300 mg by mouth 2 (two) times a day, Disp: , Rfl:     methocarbamol (ROBAXIN) 500 mg tablet, if needed, Disp: , Rfl:     Mirena, 52 MG, 20 MCG/24HR IUD, , Disp: , Rfl:     mometasone (ELOCON) 0.1 % cream, Apply topically daily, Disp: , Rfl:     naproxen (NAPROSYN) 500 mg tablet, Take every 12 hrs with food., Disp: , Rfl:     amphetamine-dextroamphetamine (ADDERALL) 20 mg tablet, Take 1 tablet by mouth 2 (two) times a day, Disp: , Rfl:     No Known Allergies    Physical Exam:    /81   Pulse 76   Ht 5' 2\" (1.575 m)   Wt 49 kg (108 lb)   BMI 19.75 kg/m²     Constitutional: normal, well developed, well nourished, alert, in no distress and non-toxic and no overt pain behavior.  Eyes: anicteric  HEENT: grossly intact  Neck: supple, symmetric, trachea midline and no masses   Pulmonary:even and unlabored  Cardiovascular:No edema or pitting edema present  Skin:Normal without rashes or lesions and well hydrated  Psychiatric:Mood and affect appropriate  Neurologic:Cranial Nerves II-XII grossly intact  Musculoskeletal:normal    Lumbar Spine Exam    Appearance:  Normal lordosis  Palpation/Tenderness:  Ttp bilateral lumbar paraspinal region  Sensory:  no sensory deficits " noted  Motor Strength:  Left hip flexion:  5/5  Left hip extension:  5/5  Right hip flexion:  5/5  Right hip extension:  5/5  Left knee flexion:  5/5  Left knee extension:  5/5  Right knee flexion:  5/5  Right knee extension:  5/5  Left foot dorsiflexion:  5/5  Left foot plantar flexion:  5/5  Right foot dorsiflexion:  5/5  Right foot plantar flexion:  5/5  Reflexes:  Left Patellar:  2+   Right Patellar:  2+   Left Achilles:  2+   Right Achilles:  2+       Imaging     LUMBAR SPINE       1/23/24     INDICATION:   M54.50: Low back pain, unspecified  G89.29: Other chronic pain  S33.5XXA: Sprain of ligaments of lumbar spine, initial encounter.     COMPARISON:  None     VIEWS:  XR SPINE LUMBAR COMPLETE W BENDING MINIMUM 6 VIEWS  Images: 6     FINDINGS: An IUD is noted.     There are 5 non rib bearing lumbar vertebral bodies.     There is no evidence of acute fracture or destructive osseous lesion.     Alignment is unremarkable.     No significant lumbar degenerative change noted.     The pedicles appear intact. Pars defects noted in L5.     Soft tissues are unremarkable.     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 Normal alignment.     No acute osseous abnormalities.     No orders to display       Orders Placed This Encounter   Procedures    Ambulatory referral to Physical Therapy

## 2024-10-11 ENCOUNTER — CONSULT (OUTPATIENT)
Dept: PAIN MEDICINE | Facility: CLINIC | Age: 24
End: 2024-10-11
Payer: COMMERCIAL

## 2024-10-11 VITALS
HEART RATE: 76 BPM | DIASTOLIC BLOOD PRESSURE: 81 MMHG | HEIGHT: 62 IN | SYSTOLIC BLOOD PRESSURE: 117 MMHG | WEIGHT: 108 LBS | BODY MASS INDEX: 19.88 KG/M2

## 2024-10-11 DIAGNOSIS — R52 TOTAL BODY PAIN: ICD-10-CM

## 2024-10-11 DIAGNOSIS — G89.4 CHRONIC PAIN SYNDROME: ICD-10-CM

## 2024-10-11 DIAGNOSIS — M43.00 PARS DEFECT: ICD-10-CM

## 2024-10-11 DIAGNOSIS — M54.42 CHRONIC BILATERAL LOW BACK PAIN WITH LEFT-SIDED SCIATICA: Primary | ICD-10-CM

## 2024-10-11 DIAGNOSIS — G89.29 CHRONIC BILATERAL LOW BACK PAIN WITH LEFT-SIDED SCIATICA: Primary | ICD-10-CM

## 2024-10-11 PROCEDURE — 99204 OFFICE O/P NEW MOD 45 MIN: CPT | Performed by: STUDENT IN AN ORGANIZED HEALTH CARE EDUCATION/TRAINING PROGRAM

## 2024-10-11 NOTE — PATIENT INSTRUCTIONS
"Patient Education     Autumn-Danlos syndrome   The Basics   Written by the doctors and editors at [facility]   What is Autumn-Danlos syndrome? -- Autumn-Danlos syndrome (\"EDS\") is the name for a group of disorders that involve problems with the body's \"connective tissues.\" Connective tissues make up and support the skin, bones, blood vessels, and other organs.  There are 6 main types of EDS (table 1) and 13 types overall. Some types are very rare. Most types are caused by a problem with 1 or more genes. For the most common type (\"hypermobility\" EDS), the exact cause is not yet known, although experts suspect it is also genetic.  In some cases, people get EDS because they get an abnormal gene from 1 or both of their parents. In other cases, the abnormal gene does not come from either parent, but appears on its own.  What are the symptoms of EDS? -- Each type of EDS has slightly different symptoms. In most cases, the skin and joints are affected.  Common symptoms of EDS include:   Stretchy skin - The skin stretches more than normal if you pull on it. The medical term for this is \"hyperextensibility.\"   Easy bruising of the skin   Thin skin that can get cut easily - Cuts can take longer to heal. When they do heal, they can leave abnormal-looking scars.   Joints that are too loose and more flexible than normal - The medical term for this is \"hypermobility.\" Loose joints can lead to problems like dislocation (when a joint pops out of place) or pain.  Some types of EDS can lead to more serious problems. For example:   Heart problems - Some people with EDS have heart problems, although this is uncommon. One example is a widening of the aorta, which is the big artery that carries blood from the heart to the rest of the body. Another example is a condition called \"mitral valve prolapse,\" in which 1 of the valves in the heart doesn't close normally. These heart problems are not always serious, but if you have them, your doctor " "can decide if they need treatment.   A \"rupture\" or tear inside the body - The most serious problem that can happen is a sudden tear in a blood vessel or organ wall. When this happens, it can cause internal bleeding and be life-threatening. But this is usually only a risk with certain types of EDS, including \"vascular\" EDS.  Is there a test for EDS? -- There are tests that can check for the genes that cause most types of EDS. But the most common type, called \"hypermobility EDS,\" does not have a test. That's because doctors don't yet know which gene is affected in most people with this type.  How is EDS treated? -- There is no cure for EDS. But your doctor or specialist can work with you to treat some of the symptoms and prevent or reduce some of the problems EDS can cause. Treatments can include:   Physical therapy - You can work with a physical therapist (exercise expert) to learn exercises to safely strengthen your muscles and joints.   Braces or other devices - Knee braces, shoe inserts, or a cane can help support the joints.   Pain medicines - Your doctor might tell you to take acetaminophen (sample brand name: Tylenol) for joint and muscle pain. They might also suggest NSAIDs, which include aspirin, ibuprofen (sample brand names: Advil, Motrin), and naproxen (sample brand name: Aleve). But NSAIDs might not be safe in people whose skin bruises easily.   Counseling - Living with EDS can be stressful, especially if you have pain or have to limit your activities. If you are struggling, counseling can help. You might also find it helpful to join a support group where you can talk to other people with EDS.  Many people with EDS need to see a heart doctor regularly. This is to monitor any heart problems and check for new ones.  If you suddenly get any of the following symptoms, go to the emergency department right away or call for an ambulance (in the [place] and [place], call 9-1-1). These can be signs of internal bleeding " "or other serious problems caused by some types of EDS:   Chest, back, or belly pain   Trouble breathing   Severe headache   Dizziness  Is there anything I can do on my own? -- Yes. There are things that you can do to help with symptoms and prevent injury. You can:   Get exercise, but be careful and stop if your joints or muscles hurt.   Avoid contact sports and other activities that could hurt your joints (like running). Try gentle activities like swimming or elisa chi instead.   Take hot baths to help with joint pain.   Protect your skin with bandages or pads, especially if you often get cuts in certain areas (like the knees or shins).   Avoid chewing gum and biting into hard foods, as this can cause jaw pain.  What if I want to get pregnant? -- If you or your partner want to get pregnant, talk with your doctor or nurse before you start trying. If either of you has EDS, your children have a chance of getting it, too. You will probably want to meet with a  or genetic counselor. They can help you understand what having the gene could mean for you and your family.  Pregnant people with EDS need special care. Each type of EDS has different risks for a pregnant person and the baby. You (or your partner) will need to see a doctor with experience caring for \"high-risk\" pregnancies.  What will my life be like? -- It depends on which type of EDS you have. People with some types might only have mild symptoms, like stretchy skin and loose joints. But people with the more serious types can have life-threatening internal bleeding.  Every person is different, and it's impossible to know exactly how your EDS will affect you. But your doctor or nurse can talk to you about what to expect. It might also be helpful to talk with a specialist in medical genetics who has experience with EDS.  All topics are updated as new evidence becomes available and our peer review process is complete.  This topic retrieved from [employer] on: " "Mar 28, 2024.  Topic 64824 Version 12.0  Release: 32.2.4 - C32.86  © 2024 UpToDate, Inc. and/or its affiliates. All rights reserved.  table 1: Type of Autumn-Danlos syndrome and common features  Type  Features     Skin  Joints  Other    Classic (EDS I and II) Velvety or \"doughy\" texture  Stretchy  Fragile, easily injured, slow to heal  Wide scars from past cuts or injuries Loose, flexible Fatigue  Hernias (weak spots in organ walls that can cause a lump or bulge)  Heart problems  Soft lumps on the elbows or knees  Small bumps on the sides of the feet  Pregnancy-related problems   Hypermobility (EDS III) Soft, smooth, mildly stretchy  Not usually fragile Loose, flexible (including the spine)  Frequent dislocations (popping out of place)  Chronic pain in the joints Heart problems  Digestive problems  Fatigue  Dizziness upon standing   Vascular (EDS IV) Thin, bruises easily Large joints (elbows, knees) usually normal  Small joints (fingers, toes) might be somewhat loose or flexible Tear in a blood vessel or organ wall - This can cause internal bleeding and be life-threatening   Pregnancy-related problems   There are 6 main types of Autumn-Danlos syndrome (\"EDS\"). This table lists the 3 most common, and some of the features often seen in each. Not every person will have the exact same symptoms. The other 3 main types of EDS are called \"kyphoscoliosis EDS,\" \"arthrochalasia EDS,\" and \"dermatosparaxis EDS.\" These are less common than the types listed above. In addition, there are also a few other forms of EDS. But these are extremely rare.  Graphic 84012 Version 3.0  Consumer Information Use and Disclaimer   Disclaimer: This generalized information is a limited summary of diagnosis, treatment, and/or medication information. It is not meant to be comprehensive and should be used as a tool to help the user understand and/or assess potential diagnostic and treatment options. It does NOT include all information about " conditions, treatments, medications, side effects, or risks that may apply to a specific patient. It is not intended to be medical advice or a substitute for the medical advice, diagnosis, or treatment of a health care provider based on the health care provider's examination and assessment of a patient's specific and unique circumstances. Patients must speak with a health care provider for complete information about their health, medical questions, and treatment options, including any risks or benefits regarding use of medications. This information does not endorse any treatments or medications as safe, effective, or approved for treating a specific patient. UpToDate, Inc. and its affiliates disclaim any warranty or liability relating to this information or the use thereof.The use of this information is governed by the Terms of Use, available at https://www.woltersMyowsuwer.com/en/know/clinical-effectiveness-terms. 2024© UpToDate, Inc. and its affiliates and/or licensors. All rights reserved.  Copyright   © 2024 UpToDate, Inc. and/or its affiliates. All rights reserved.

## 2024-10-16 ENCOUNTER — TELEPHONE (OUTPATIENT)
Age: 24
End: 2024-10-16

## 2024-10-16 NOTE — TELEPHONE ENCOUNTER
Caller: patient    Doctor: SP    Reason for call: would like her PT script faxed over     FAX# [phone]  [facility]         Call back#:

## 2024-12-03 ENCOUNTER — TELEPHONE (OUTPATIENT)
Age: 24
End: 2024-12-03

## 2024-12-03 NOTE — TELEPHONE ENCOUNTER
Caller: Pt     Doctor: Tom     Reason for call: Pt currently has [employer] insurance and she received a letter indicating Dr Santos will not taking her insurance as of 12/31/2024,    She is currently doing Aqua therapy (script from Tom) and PT and would like to know if this will effect that. I advised Pt to ask aqua therapy location if they will take her insurance.    Pt would like to know if someone can call her back to discuss further as she is feeling relief from aqua therapy.     Call back#: [phone]

## 2024-12-04 ENCOUNTER — DOCUMENTATION (OUTPATIENT)
Dept: PAIN MEDICINE | Facility: CLINIC | Age: 24
End: 2024-12-04

## 2024-12-05 NOTE — TELEPHONE ENCOUNTER
S/w patient Discussed that I will mail a list for other pain providers in the area and she will have to call and verify that they take her insurance, Told patient to talk to the [facility] PT location as it is not one of our locations about coverage

## 2024-12-09 NOTE — TELEPHONE ENCOUNTER
Katelyn  S/w pt. Dr. Garcia is doing a discogram and she wanted SP to be aware. [facility] no longer accepts pt's insurance effective 12/31. Pt wants SP to be aware, in case she changes insurance and is able to continue care with him.

## 2024-12-09 NOTE — TELEPHONE ENCOUNTER
Caller: Samaria     Doctor: Tom     Reason for call: Patient would like to speak to nurse would like to discuss and asked a questions     Call back#: [phone]

## 2024-12-09 NOTE — TELEPHONE ENCOUNTER
Caller: Samaria     Doctor: Tom     Reason for call: Patient calling asking if list of different pain doctors was mailed please advise     Call back#: [phone]

## 2025-01-14 NOTE — PROGRESS NOTES
"Pain Medicine Follow-Up Note    Assessment:  1. Lumbar radiculopathy    2. Total body pain    3. Chronic bilateral low back pain with left-sided sciatica    4. Pars defect        Plan:  Orders Placed This Encounter   Procedures   • MRI lumbar spine wo contrast     Standing Status:   Future     Expected Date:   1/16/2025     Expiration Date:   1/16/2029     Scheduling Instructions:      There is no preparation for this test. Please leave your jewelry and valuables at home, wedding rings are the exception. All patients will be required to change into a hospital gown and pants.  Street clothes are not permitted in the MRI.  Magnetic nail polish must be removed prior to arrival for your test. Please bring your insurance cards, a form of photo ID and a list of your medications with you. Arrive 15 minutes prior to your appointment time in order to register. Please bring any prior CT or MRI studies of this area that were not performed at a [facility].            To schedule this appointment, please contact Central Scheduling at [phone].            Prior to your appointment, please make sure you complete the MRI Screening Form when you e-Check in for your appointment. This will be available starting 7 days before your appointment in [employer]. You may receive an e-mail with an activation code if you do not have a [employer] account. If you do not have access to a device, we will complete your screening at your appointment.     Reason for Exam:   lumbar radiculopathy     Is the patient pregnant?:   Unknown     For OP exams needed \"URGENT\", choose the appropriate timeframe below and call Central Scheduling at [phone]. No need to speak with a Radiologist.:   Not URGENT     What is the patient's sedation requirement?:   No Sedation     Does the patient need medication for Claustrophobia? If yes, order medication at this point.:   No     Does the patient wear a life vest, have an implanted cardiac device, a " stimulation device, a sleep apnea stimulator, or a breast tissue expansion device?:   No     [facility]   Immediate   • Ambulatory referral to Physical Therapy     Standing Status:   Future     Expiration Date:   1/16/2026     Referral Priority:   Routine     Referral Type:   Physical Therapy     Referral Reason:   Specialty Services Required     Requested Specialty:   Physical Therapy     Number of Visits Requested:   1     Expiration Date:   1/16/2026     My impressions and treatment recommendations were discussed in detail with the patient who verbalized understanding and had no further questions.      Patient returns to the office after completing aqua therapy patient continues to report bilateral low back pain that radiates down her left posterior leg to her foot.  I recommend the patient have a lumbar MRI to further evaluate the cause of this pain.  Patient does report that aqua therapy was helpful but her pain continues.      The patient reports that she continues to use gabapentin 300 mg twice daily along with naproxen 500 mg and methocarbamol 500 mg prescribed through her PCP.    Follow-up is planned in as needed time or sooner as warranted.  Discharge instructions were provided. I personally saw and examined the patient and I agree with the above discussed plan of care.    History of Present Illness:    Samaria Goldman is a 24 y.o. female who presents to [facility] for interval re-evaluation of the above stated pain complaints. The patient has a past medical and chronic pain history as outlined in the assessment section. She was last seen on 10/11/2024.    At today's visit patient states that their pain symptoms are better with a pain score of 5/10 on the verbal numeric pain scale.  The patient's pain is worse at no particular time.  The patient's pain is constant in nature.  And the quality of the patient's pain is described as burning, cramping,  shooting, sharp, throbbing, and dull and-aching.  The patient's pain is located in the anterior rib cage, bilateral low back radiating down her left lateral leg to her foot.    Other than as stated above, the patient denies any interval changes in medications, medical condition, mental condition, symptoms, or allergies since the last office visit.         Review of Systems:    Review of Systems   Respiratory:  Negative for shortness of breath.    Cardiovascular:  Negative for chest pain.   Gastrointestinal:  Negative for constipation, diarrhea, nausea and vomiting.   Musculoskeletal:  Positive for gait problem. Negative for arthralgias, joint swelling and myalgias.   Skin:  Negative for rash.   Neurological:  Negative for dizziness, seizures and weakness.   All other systems reviewed and are negative.        Past Medical History:   Diagnosis Date   • Difficulty walking    • Eczema    • Migraines    • Peripheral neuropathy 1/17/24       History reviewed. No pertinent surgical history.    Family History   Problem Relation Age of Onset   • Migraines Mother    • Seizures Mother    • No Known Problems Father        Social History     Occupational History   • Not on file   Tobacco Use   • Smoking status: Never   • Smokeless tobacco: Never   • Tobacco comments:     Second hand smoke    Vaping Use   • Vaping status: Never Used   Substance and Sexual Activity   • Alcohol use: Yes     Comment: Very rarely   • Drug use: Yes     Types: Marijuana     Comment: Medical   • Sexual activity: Yes     Partners: Male     Birth control/protection: I.U.D.     Comment: Boyfriend         Current Outpatient Medications:   •  gabapentin (NEURONTIN) 300 mg capsule, Take 300 mg by mouth 2 (two) times a day, Disp: , Rfl:   •  methocarbamol (ROBAXIN) 500 mg tablet, if needed, Disp: , Rfl:   •  Mirena, 52 MG, 20 MCG/24HR IUD, , Disp: , Rfl:   •  mometasone (ELOCON) 0.1 % cream, Apply topically daily, Disp: , Rfl:   •  naproxen (NAPROSYN) 500 mg  "tablet, Take every 12 hrs with food., Disp: , Rfl:   •  amphetamine-dextroamphetamine (ADDERALL) 20 mg tablet, Take 1 tablet by mouth 2 (two) times a day, Disp: , Rfl:     No Known Allergies    Physical Exam:    Ht 5' 2\" (1.575 m)   Wt 49 kg (108 lb)   BMI 19.75 kg/m²     Constitutional:normal, well developed, well nourished, alert, in no distress and non-toxic and no overt pain behavior.  Eyes:anicteric  HEENT:grossly intact  Neck:supple, symmetric, trachea midline and no masses   Pulmonary:even and unlabored  Cardiovascular:No edema or pitting edema present  Skin:Normal without rashes or lesions and well hydrated  Psychiatric:Mood and affect appropriate  Neurologic:Cranial Nerves II-XII grossly intact  Musculoskeletal:normal gait        Orders Placed This Encounter   Procedures   • MRI lumbar spine wo contrast   • Ambulatory referral to Physical Therapy       This document was created using speech voice recognition software.   Grammatical errors, random word insertions, pronoun errors, and incomplete sentences are an occasional consequence of this system due to software limitations, ambient noise, and hardware issues.   Any formal questions or concerns about content, text, or information contained within the body of this dictation should be directly addressed to the provider for clarification.    "

## 2025-01-16 ENCOUNTER — OFFICE VISIT (OUTPATIENT)
Dept: PAIN MEDICINE | Facility: CLINIC | Age: 25
End: 2025-01-16

## 2025-01-16 VITALS — HEIGHT: 62 IN | WEIGHT: 108 LBS | BODY MASS INDEX: 19.88 KG/M2

## 2025-01-16 DIAGNOSIS — M43.00 PARS DEFECT: ICD-10-CM

## 2025-01-16 DIAGNOSIS — G89.29 CHRONIC BILATERAL LOW BACK PAIN WITH LEFT-SIDED SCIATICA: ICD-10-CM

## 2025-01-16 DIAGNOSIS — M54.16 LUMBAR RADICULOPATHY: Primary | ICD-10-CM

## 2025-01-16 DIAGNOSIS — R52 TOTAL BODY PAIN: ICD-10-CM

## 2025-01-16 DIAGNOSIS — M54.42 CHRONIC BILATERAL LOW BACK PAIN WITH LEFT-SIDED SCIATICA: ICD-10-CM

## 2025-01-16 NOTE — PATIENT INSTRUCTIONS
Core Strengthening Exercises   WHAT YOU NEED TO KNOW:   What do I need to know about core strengthening exercises?  Core strengthening exercises help heal and strengthen muscles of your hips, back, and abdomen to prevent reinjury. They are beginning exercises to help support your spine. Ask your healthcare provider if you need to see a physical therapist for more advanced exercises.  Do the exercises on a mat or firm surface  (not on a bed) to support your spine and avoid low back pain.     Do the exercises in the same order every time  to train your muscles to work together. Your healthcare provider will show you how to perform these exercises. Do them every day, or as directed by your healthcare provider.     Move slowly and smoothly.  Avoid fast or jerky motions.     Stop if you feel pain.  It is normal to feel some discomfort at first. Regular exercise will help decrease your discomfort over time.  How do I perform core strengthening exercises safely?  Hold each exercise for 5 seconds. When you can do the exercise without pain for 5 seconds, increase your hold to 10 to 15 seconds. When you can do the exercise without pain for 10 to 15 seconds, add the next exercise. Increase the time you hold each exercise, or repeat the exercises as directed. As you do each exercise, breathe normally. Do not hold your breath.  Abdominal bracing:  Lie on your back with your knees bent and feet flat on the floor. Place your arms in a relaxed position beside your body. Pull your belly button in toward your spine. Do not flatten or arch your back. Tighten the abdominal muscles below your belly button. Hold for 5 seconds. Begin all of your exercises with abdominal bracing. You can also practice abdominal bracing throughout the day while you are sitting or standing.           Bridging:  Lie on your back with your knees bent and feet flat on the floor. Rest your arms at your side. Tighten your buttocks, and then lift your hips 1 inch  off the floor. Hold for 5 seconds. When you can do this exercise without pain for 10 seconds, increase the distance you lift your hips. A good goal is to be able to lift your hips so that your shoulders, hips, and knees are in a straight line.           Curl up:  Lie on your back with your knees bent and feet flat on the floor. Place your hands, palms down, underneath the curve in your lower back. Next, with your elbows on the floor, lift your shoulders and chest 2 to 3 inches. Keep your head in line with your shoulders. Hold this position for 5 seconds. When you can do this exercise without pain for 10 to 15 seconds, you may add a rotation. While your shoulders and chest are lifted off the ground, turn slightly to the left and hold. Repeat on the other side.           Dead bug:  Lie on your back with your knees bent and feet flat on the floor. Place your arms in a relaxed position beside your body. Begin with abdominal bracing. Next, raise one leg, keeping your knee bent. Hold for 5 seconds. Repeat with the other leg. When you can do this exercise without pain for 10 to 15 seconds, you may raise one straight leg and hold. Repeat with the other leg.           Quadruped:  Place your hands and knees on the floor. Keep your wrists directly below your shoulders and your knees directly below your hips. Pull your belly button in toward your spine. Do not flatten or arch your back. Tighten your abdominal muscles below your belly button. Hold for 5 seconds. When you can do this exercise without pain for 10 to 15 seconds, you may extend one arm and hold. Repeat on the other side.           Side Bridge:      Standing side bridge:  Stand next to a wall and extend one arm toward the wall. Place your palm flat on the wall with your fingers pointing upward. Begin with abdominal bracing. Next, without moving your feet, slowly bend your arm to 90 degrees. Hold for 5 seconds. Repeat on the other side. When you can do this exercise  without pain for 10 to 15 seconds, you may do the bent leg side bridge on the floor.           Bent leg side bridge:  Lie on one side with your legs, hips, and shoulders in a straight line. Prop yourself up onto your forearm so your elbow is directly below your shoulder. Bend your knees back to 90 degrees. Begin with abdominal bracing. Next, lift your hips and balance yourself on your forearm and knees. Hold for 5 seconds. Repeat on the other side. When you can do this exercise without pain for 10 to 15 seconds, you may do the straight leg side bridge on the floor.           Straight leg side bridge:  Lie on one side with your legs, hips, and shoulders in a straight line. Prop yourself up onto your forearm so your elbow is directly below your shoulder. Begin with abdominal bracing. Lift your hips off the floor and balance yourself on your forearm and the outside of your flexed foot. Do not let your ankle bend sideways. Hold for 5 seconds. Repeat on the other side. When you can do this exercise without pain for 10 to 15 seconds, ask your healthcare provider for more advanced exercises.       When should I contact my healthcare provider?   Your pain becomes worse.    You have new pain.    You have questions or concerns about your condition, care, or exercise program.  CARE AGREEMENT:   You have the right to help plan your care. Learn about your health condition and how it may be treated. Discuss treatment options with your caregivers to decide what care you want to receive. You always have the right to refuse treatment. The above information is an  only. It is not intended as medical advice for individual conditions or treatments. Talk to your doctor, nurse or pharmacist before following any medical regimen to see if it is safe and effective for you.  © 2016 [employer]. Information is for End User's use only and may not be sold, redistributed or otherwise used for commercial purposes.  All illustrations and images included in CareNotes® are the copyrighted property of [email or web address] or [employer].

## 2025-01-23 ENCOUNTER — TELEPHONE (OUTPATIENT)
Age: 25
End: 2025-01-23

## 2025-01-23 NOTE — TELEPHONE ENCOUNTER
LMOM to CB, CB# provided  PLEASE advise pt that she needs to schedule MRI and then the Auth team gets approval  If she has scheduled outside the network, please get this information and send to Auth team

## 2025-01-23 NOTE — TELEPHONE ENCOUNTER
Caller: demetrice    Doctor: satnam    Reason for call: pt is going to figure out where to go and then call us back.    Call back#: [phone]

## 2025-01-23 NOTE — TELEPHONE ENCOUNTER
Caller: Samaria     Doctor: Tom     Reason for call: Patient calling calvin she needs pre approval before scheduling MRI please advise     Call back#: [phone]

## 2025-01-29 NOTE — TELEPHONE ENCOUNTER
"Pt is calling in to discuss next steps as far as her symptoms that her PCP is thinking could be Fibromyalgia  States she saw her PCP today and was advised to contact [facility] for possible medications that could be used to help manage.  Pt saw PM&R and they are going to be doing a discography and she is also scheduled for a MRI on 2/7 ordered by MG.    States she wanted to know if she could discuss this with SP or should she have an in person visit before or after her second round of AT just ordered by MG.  Could not discern if pt was saying MG said she would s/w SP regarding possibility of Fibromyalgia or if it was her PCP saying this. Attempted to narrow down what pt would like but there was a lot of information from her regarding her current issues, past issues and possible issues.  Please review notes from PM&R and her PCP visits.    Should she make appt with SP after all testing and PT is completed?  Would SP even consider treating Fibromyalgia or add any medications given that pt is on Gabapentin for \"anxiety and nerves\"  Please advise  "

## 2025-01-29 NOTE — TELEPHONE ENCOUNTER
Caller: demetrice    Doctor: satnam    Reason for call: pt has questions about fibromyalgia. She would like to talk to someone about this.    Call back#: [phone]

## 2025-01-29 NOTE — TELEPHONE ENCOUNTER
We do not treat Fibromyalgia.     The treatments for fibromyalgia are exercise, and anti inflammatory medications. Sometimes PCP can prescribe duloxetine which is an antidepressant medication that is FDA approved for chronic pain, but is sometimes used to treat fibromyalgia.

## 2025-03-04 ENCOUNTER — HOSPITAL ENCOUNTER (OUTPATIENT)
Dept: MRI IMAGING | Facility: CLINIC | Age: 25
Discharge: HOME | End: 2025-03-04
Payer: COMMERCIAL

## 2025-03-04 DIAGNOSIS — G89.29 CHRONIC BILATERAL LOW BACK PAIN WITH LEFT-SIDED SCIATICA: ICD-10-CM

## 2025-03-04 DIAGNOSIS — M43.00 PARS DEFECT: ICD-10-CM

## 2025-03-04 DIAGNOSIS — M54.42 CHRONIC BILATERAL LOW BACK PAIN WITH LEFT-SIDED SCIATICA: ICD-10-CM

## 2025-03-04 PROCEDURE — 72148 MRI LUMBAR SPINE W/O DYE: CPT

## 2025-03-05 ENCOUNTER — RESULTS FOLLOW-UP (OUTPATIENT)
Dept: PAIN MEDICINE | Facility: CLINIC | Age: 25
End: 2025-03-05

## 2025-03-05 NOTE — TELEPHONE ENCOUNTER
The asymptomatic means that generally most people with this condition do not have symptoms.  It could be that your pain symptoms are originating from another cause or perhaps this is causing pain for you.    In the condition of anterolisthesis whether it is caused by trauma or degeneration the grade of it is mild, so it  is not impinging any nerves therefore would not cause pain for most patients everybody is unique.

## 2025-03-06 ENCOUNTER — DOCUMENTATION (OUTPATIENT)
Dept: PAIN MEDICINE | Facility: CLINIC | Age: 25
End: 2025-03-06